# Patient Record
(demographics unavailable — no encounter records)

---

## 2025-04-21 NOTE — REASON FOR VISIT
[____ Week(s)] : [unfilled] week(s)  [Patient] : patient [Father] : father [Medical Records] : medical records [Other: ____] : [unfilled] [Pain] : ~He/She~ does not have pain [Fever] : ~He/She~ does not have fever [Redness at incision] : ~He/She~ does not have redness at incision [Drainage at incision] : ~He/She~ does not have drainage at incision [Swelling at surgical site] : ~He/She~ does not have swelling at surgical site [de-identified] : 3-30-25 [de-identified] : Dr Stoner [de-identified] :  EUA showed macerated clitoral marmolejo with small <5mm tear on right; very small tear to left introital opening Cystoscopy and vaginoscopy revealed no internal injuries No  PMHx BIBEMS after straddle injury. She was at [employer] playing with an exercise machine similar to a stationary bike and patient slipped and fell on metal ryan in front of seat. In the Er she was in severe pain, with consistent bleeding from genital region. Out of fear she was unable to void for 4 hours so she was brought to the OR for EUA. Also concerns for asymmetry of pelvic XRs of pelvis including inlet and outlet views reviewed by orthopedic team. No further fractures identified. She was cleared by ortho for d/c with f/u with Dr. Lane.  Was evaluated by PT who did not recommend PT unless having difficulty w stairs once d/c home.  She presents for a f/u visit.  Denies any further staining or bleeding from perineum.  Per mom she is going downstairs differently so would like another week of elevator pass and no gym.  Denies pain and is ambulating easily in the office.

## 2025-04-21 NOTE — ASSESSMENT
[FreeTextEntry1] : Now 3 weeks post op from pelvic straddle injury while playing on exercise equipment.  She is doing well and denies dysuria or any further bleeding or pain.  Mom is asking for another week of no gym and special accommodations at school which was provided.  Recommended they f/u with ortho as recommended at d/c.  Information provided.  Return precautions discussed she can f/u PRN.

## 2025-04-21 NOTE — PHYSICAL EXAM
[Normal external genitalia] : normal external genitalia [NL] : moves all extremities x4, warm well perfused x4